# Patient Record
Sex: FEMALE | Race: WHITE | NOT HISPANIC OR LATINO | Employment: UNEMPLOYED | URBAN - METROPOLITAN AREA
[De-identification: names, ages, dates, MRNs, and addresses within clinical notes are randomized per-mention and may not be internally consistent; named-entity substitution may affect disease eponyms.]

---

## 2018-01-29 ENCOUNTER — TELEPHONE (OUTPATIENT)
Dept: VASCULAR SURGERY | Facility: CLINIC | Age: 43
End: 2018-01-29

## 2018-01-29 DIAGNOSIS — R60.0 LOCALIZED EDEMA: Primary | ICD-10-CM

## 2018-01-29 NOTE — TELEPHONE ENCOUNTER
----- Message from Jennie Diallo sent at 1/29/2018 10:32 AM CST -----  Contact: self  Needs to schedule Sclerotherapy. Please call back [phone]

## 2018-01-30 ENCOUNTER — TELEPHONE (OUTPATIENT)
Dept: VASCULAR SURGERY | Facility: CLINIC | Age: 43
End: 2018-01-30

## 2018-01-30 NOTE — TELEPHONE ENCOUNTER
----- Message from Brenda Griffith sent at 1/30/2018 11:16 AM CST -----  Contact: patient   [facility] Please advise.   Call back   Thanks!

## 2018-01-30 NOTE — TELEPHONE ENCOUNTER
Hx of VLGS in 2016, states she is having swelling, varicose veins bothering her.  Ultrasound of BLE veins ordered and scheduled per [facility]'s on 2/19/18, and visit scheduled with Dr Teixeira on 2/21/17.

## 2018-02-19 ENCOUNTER — HOSPITAL ENCOUNTER (OUTPATIENT)
Dept: RADIOLOGY | Facility: HOSPITAL | Age: 43
Discharge: HOME | End: 2018-02-19
Attending: THORACIC SURGERY (CARDIOTHORACIC VASCULAR SURGERY)
Payer: COMMERCIAL

## 2018-02-19 DIAGNOSIS — R60.0 LOCALIZED EDEMA: ICD-10-CM

## 2018-02-19 PROCEDURE — 93970 EXTREMITY STUDY: CPT | Mod: TC,PO

## 2018-02-19 PROCEDURE — 93970 EXTREMITY STUDY: CPT | Mod: 26,,, | Performed by: RADIOLOGY

## 2018-02-21 ENCOUNTER — TELEPHONE (OUTPATIENT)
Dept: VASCULAR SURGERY | Facility: CLINIC | Age: 43
End: 2018-02-21

## 2018-02-21 ENCOUNTER — OFFICE VISIT (OUTPATIENT)
Dept: VASCULAR SURGERY | Facility: CLINIC | Age: 43
End: 2018-02-21
Payer: COMMERCIAL

## 2018-02-21 VITALS
WEIGHT: 150 LBS | DIASTOLIC BLOOD PRESSURE: 77 MMHG | BODY MASS INDEX: 23.54 KG/M2 | HEIGHT: 67 IN | SYSTOLIC BLOOD PRESSURE: 113 MMHG | HEART RATE: 67 BPM

## 2018-02-21 DIAGNOSIS — R60.0 LOWER EXTREMITY EDEMA: Primary | ICD-10-CM

## 2018-02-21 PROCEDURE — 99215 OFFICE O/P EST HI 40 MIN: CPT | Mod: S$GLB,,, | Performed by: THORACIC SURGERY (CARDIOTHORACIC VASCULAR SURGERY)

## 2018-02-21 PROCEDURE — 99999 PR PBB SHADOW E&M-EST. PATIENT-LVL III: CPT | Mod: PBBFAC,,, | Performed by: THORACIC SURGERY (CARDIOTHORACIC VASCULAR SURGERY)

## 2018-02-21 PROCEDURE — 3008F BODY MASS INDEX DOCD: CPT | Mod: S$GLB,,, | Performed by: THORACIC SURGERY (CARDIOTHORACIC VASCULAR SURGERY)

## 2018-02-21 NOTE — PROGRESS NOTES
OFFICE VISIT NOTE    HISTORY OF PRESENT ILLNESS:  The patient is a 43-year-old female with reticular   and spider veins of bilateral lower extremities.  She has undergone endovenous   laser treatment of bilateral greater and the right lesser saphenous veins.  I   last saw her in 2015.  Her last three procedures were Veinlite-guided   sclerotherapy.  She got excellent results and is very happy.  She has developed   some new spider and reticular veins and wants these treated.    PAST MEDICAL HISTORY:  Mitral valve prolapse, venous insufficiency of the lower   extremities, varicose veins with pain and edema.    PAST SURGICAL HISTORY:  Breast augmentation, endovenous laser treatment of   bilateral greater saphenous vein and the right lesser saphenous vein,   Veinlite-guided sclerotherapy.    ALLERGIES:  No known drug allergies.    MEDICATIONS:  Spironolactone.    FAMILY HISTORY:  Hypertension, arthritis, myocardial infarction.    SOCIAL HISTORY:  She never smoked cigarettes.  She drinks alcohol occasionally.    REVISION OF SYSTEMS:  She complains of spider and reticular veins of bilateral   lower extremities with occasional aching of the legs.  All other systems are   reviewed and are negative.    PHYSICAL EXAMINATION:  VITAL SIGNS:  Blood pressure is 113/77, respiratory rate is 18, heart rate is   67, height 5 feet 7 inches, weight 150 pounds.  GENERAL:  She is awake and alert, in no apparent distress.  HEENT:  Head is normocephalic.  Pupils equal, round and reactive.  Sclerae are   anicteric.  NECK:  Supple.  Trachea midline.  No masses.  LUNGS:  Clear.  HEART:  Has a regular rate and rhythm.  ABDOMEN:  Soft and nontender.  No masses.  Bowel sounds are present.  EXTREMITIES:  No ulcers.  No edema.  No hyperpigmentation.  She has reticular   and spider veins of bilateral lower extremities.  NEUROLOGIC:  Awake, alert and oriented.  No lateralizing neurologic deficits.    STUDIES:  Ultrasound of the veins of the lower  extremities showed absence of   bilateral greater and the right small saphenous veins.  These were ablated   previously.  The left lesser saphenous vein shows no evidence of venous   insufficiency.  She has no DVT nor venous insufficiency of the deep system.    IMPRESSION:  1.  Reticular veins of bilateral lower extremities.  2.  Spider veins of bilateral lower extremities.  3.  Status post endovenous laser ablation of bilateral greater and the right   lesser saphenous veins.    PLAN:  We will proceed with Veinlite-guided sclerotherapy of reticular and   spider veins of bilateral lower extremities.  The patient is aware of the risks   of hyperpigmentation, matting and ulceration of the legs.      MISTY  dd: 02/21/2018 10:01:55 (CST)  td: 02/21/2018 22:48:07 (CST)  Doc ID   #[id number]  Job ID #[id number]    CC:

## 2018-02-21 NOTE — TELEPHONE ENCOUNTER
----- Message from Lakisha Malone sent at 2/21/2018 11:02 AM CST -----  Contact: Patient  Patient is calling to schedule an appointment for sclera therapy. Please call patient to schedule.  Call Back#[phone]  Thanks

## 2018-02-26 NOTE — TELEPHONE ENCOUNTER
Call back to patient to schedule VLGS BLE, states she will call back once she returns from upcoming trip. Also states she will be cash pay.

## 2018-03-02 ENCOUNTER — TELEPHONE (OUTPATIENT)
Dept: VASCULAR SURGERY | Facility: CLINIC | Age: 43
End: 2018-03-02

## 2018-03-02 NOTE — TELEPHONE ENCOUNTER
----- Message from Nohemy Marcelino sent at 3/2/2018 12:55 PM CST -----  Patient is returning nurse's (Elen) call/please call patient back at [phone].

## 2018-03-06 ENCOUNTER — TELEPHONE (OUTPATIENT)
Dept: VASCULAR SURGERY | Facility: CLINIC | Age: 43
End: 2018-03-06

## 2018-03-06 NOTE — TELEPHONE ENCOUNTER
VLGS BLE scheduled on 4/11 @ 0930.  Instructions reviewed and copy mailed to home address.  Voices understanding, will call back as needed.

## 2018-04-11 ENCOUNTER — PROCEDURE VISIT (OUTPATIENT)
Dept: VASCULAR SURGERY | Facility: CLINIC | Age: 43
End: 2018-04-11

## 2018-04-11 VITALS
WEIGHT: 150 LBS | DIASTOLIC BLOOD PRESSURE: 75 MMHG | HEART RATE: 63 BPM | HEIGHT: 67 IN | SYSTOLIC BLOOD PRESSURE: 112 MMHG | BODY MASS INDEX: 23.54 KG/M2

## 2018-04-11 DIAGNOSIS — I83.813 VARICOSE VEINS OF BOTH LOWER EXTREMITIES WITH PAIN: Primary | ICD-10-CM

## 2018-04-11 PROCEDURE — 36471 NJX SCLRSNT MLT INCMPTNT VN: CPT | Mod: RT,S$GLB,, | Performed by: THORACIC SURGERY (CARDIOTHORACIC VASCULAR SURGERY)

## 2018-04-11 NOTE — PROGRESS NOTES
HISTORY OF PRESENT ILLNESS:  The patient is a 43-year-old female with reticular   and spider veins of bilateral lower extremities.  She has undergone endovenous   laser treatment of bilateral greater and the right lesser saphenous veins.  I   last saw her in 2015.  Her last three procedures were Veinlite-guided   sclerotherapy.  She got excellent results and is very happy.  She has developed   some new spider and reticular veins and wants these treated.     PAST MEDICAL HISTORY:  Mitral valve prolapse, venous insufficiency of the lower   extremities, varicose veins with pain and edema.     PAST SURGICAL HISTORY:  Breast augmentation, endovenous laser treatment of   bilateral greater saphenous vein and the right lesser saphenous vein,   Veinlite-guided sclerotherapy.     ALLERGIES:  No known drug allergies.     MEDICATIONS:  Spironolactone.     FAMILY HISTORY:  Hypertension, arthritis, myocardial infarction.     SOCIAL HISTORY:  She never smoked cigarettes.  She drinks alcohol occasionally.     REVISION OF SYSTEMS:  She complains of spider and reticular veins of bilateral   lower extremities with occasional aching of the legs.  All other systems are   reviewed and are negative.     PHYSICAL EXAMINATION:  VITAL SIGNS:  Blood pressure is 113/77, respiratory rate is 18, heart rate is   67, height 5 feet 7 inches, weight 150 pounds.  GENERAL:  She is awake and alert, in no apparent distress.  HEENT:  Head is normocephalic.  Pupils equal, round and reactive.  Sclerae are   anicteric.  NECK:  Supple.  Trachea midline.  No masses.  LUNGS:  Clear.  HEART:  Has a regular rate and rhythm.  ABDOMEN:  Soft and nontender.  No masses.  Bowel sounds are present.  EXTREMITIES:  No ulcers.  No edema.  No hyperpigmentation.  She has reticular   and spider veins of bilateral lower extremities.  NEUROLOGIC:  Awake, alert and oriented.  No lateralizing neurologic deficits.     STUDIES:  Ultrasound of the veins of the lower extremities  showed absence of   bilateral greater and the right small saphenous veins.  These were ablated   previously.  The left lesser saphenous vein shows no evidence of venous   insufficiency.  She has no DVT nor venous insufficiency of the deep system.     IMPRESSION:  1.  Reticular veins of bilateral lower extremities.  2.  Spider veins of bilateral lower extremities.  3.  Status post endovenous laser ablation of bilateral greater and the right   lesser saphenous veins.     PLAN:  We will proceed with Veinlite-guided sclerotherapy of reticular and   spider veins of bilateral lower extremities.  The patient is aware of the risks   of hyperpigmentation, matting and ulceration of the legs.

## 2018-04-11 NOTE — PROCEDURES
DATE OF PROCEDURE:  04/11/2018    PREOPERATIVE DIAGNOSIS:  Varicose veins with pain and edema of bilateral lower   extremities.    POSTOPERATIVE DIAGNOSIS:  Varicose veins with pain and edema of bilateral lower   extremities.    PROCEDURE:  Veinlite-guided sclerotherapy of multiple veins of the right lower   extremity.    SURGEON:  Oscar Teixeira M.D., F.A.C.S.    PROCEDURE IN DETAIL:  With the patient in the supine position on the procedure   table in the procedure room in the clinic, the anterior, lateral, and medial   aspects of the right lower extremity were prepped with alcohol and then treated   with Veinlite-guided sclerotherapy.  The patient was then placed in the prone   position and the posterior aspect of the right lower extremity was treated in a   similar fashion.  The sclerotherapy solution used was 0.5% polidocanol.  A total   of 20 mL of this solution was injected into multiple veins of the right lower   extremity.  The patient needs Veinlite-guided sclerotherapy of the left lower   extremity, but we had reached our limit with the amount of solution injected at   one session.  The patient will be brought back for Veinlite-guided sclerotherapy   of the left lower extremity.  Compression stocking was applied to the lower   extremities.  The patient tolerated the procedure and left the procedure room in   satisfactory and stable condition.      MISTY  dd: 04/11/2018 11:10:32 (CDT)  td: 04/11/2018 23:51:20 (CDT)  Doc ID   #[id number]  Job ID #[id number]    CC:

## 2018-04-25 ENCOUNTER — PROCEDURE VISIT (OUTPATIENT)
Dept: VASCULAR SURGERY | Facility: CLINIC | Age: 43
End: 2018-04-25

## 2018-04-25 ENCOUNTER — TELEPHONE (OUTPATIENT)
Dept: VASCULAR SURGERY | Facility: CLINIC | Age: 43
End: 2018-04-25

## 2018-04-25 VITALS
DIASTOLIC BLOOD PRESSURE: 68 MMHG | WEIGHT: 150 LBS | RESPIRATION RATE: 18 BRPM | SYSTOLIC BLOOD PRESSURE: 113 MMHG | BODY MASS INDEX: 23.49 KG/M2 | HEART RATE: 79 BPM

## 2018-04-25 DIAGNOSIS — I83.812 VARICOSE VEINS OF LEFT LOWER EXTREMITY WITH PAIN: Primary | ICD-10-CM

## 2018-04-25 PROCEDURE — 36471 NJX SCLRSNT MLT INCMPTNT VN: CPT | Mod: LT,S$GLB,, | Performed by: THORACIC SURGERY (CARDIOTHORACIC VASCULAR SURGERY)

## 2018-04-25 RX ORDER — FERROUS SULFATE, DRIED 160(50) MG
1 TABLET, EXTENDED RELEASE ORAL 2 TIMES DAILY WITH MEALS
COMMUNITY

## 2018-04-25 NOTE — PROCEDURES
DATE OF PROCEDURE:  04/25/2018    PREOPERATIVE DIAGNOSIS:  Varicose veins with pain and edema of the left lower   extremity.    POSTOPERATIVE DIAGNOSIS:  Varicose veins with pain and edema of the left lower   extremity.    OPERATION:  Veinlite-guided sclerotherapy of the left lower extremity.    SURGEON:  Oscar Teixeira M.D., F.A.C.S.    PROCEDURE IN DETAIL:  With the patient in the supine position on the procedure   table in the procedure room in the clinic, the anterior, lateral, and medial   aspects of the left lower extremity were prepped with alcohol and then treated   with Veinlite-guided sclerotherapy.  The patient was then placed in the prone   position and the posterior aspect of the left lower extremity was treated in a   similar fashion.  The sclerotherapy solution used was 0.5% polidocanol.  A total   of 20 mL of this solution was injected into multiple veins of the left lower   extremity using a 30-gauge needle attached to a 3 mL syringe.  A compression   dressing was applied to the lower extremity.  The patient tolerated the   procedure and left the procedure room in satisfactory and stable condition.      MISTY  dd: 04/25/2018 17:48:33 (CDT)  td: 04/26/2018 00:38:38 (CDT)  Doc ID   #[id number]  Job ID #[id number]    CC:

## 2018-04-25 NOTE — TELEPHONE ENCOUNTER
----- Message from Nohemy Marcelino sent at 4/25/2018 12:08 PM CDT -----  Patient needs to reschedule appointment on May 9th/please call back at [phone] to reschedule or advise.

## 2018-04-25 NOTE — PROGRESS NOTES
"Patient Name: Mary Plaza     Date: 04/25/2018    Patient Verification: Two stated identifiers    Laser Safety Checklist: N/A    Procedure: Sclerotherapy     NPO since: NA    Height: 5'7"   Weight: 150 lbs      Pre-op Vitals:   BP: 113 / 68  HR: 79 bpm   RR: 18    Allergies reviewed.  Medications reviewed.  Sedation plan reviewed.    Time Out:   Correct patient: LM   Correct procedure: LM     Correct site (marked): LM  Time completed: 1037    Procedure Start Time: 1125    Position of Patient: SUPINE AND PRONE    Treatment Solutions:    ASCLERA: Yes; 0.5 %; 20 total cc   (Solution of 2.0 cc of 1% ASCLERA, 2.0 cc of STERILE WATER)     Ultrasound Guided Sclerotherapy: No   Vein Light Guided Sclerotherapy: Yes     Laser Ablation of Spider Veins: No    Procedure End Time: 1155    Total Laser:     Total Area:  Joules: NA     NA  Pulses: NA  Time: NA    Rx Given: NO  Compression Hose Issued: NO  Post-procedural Education Given: Yes  LOC at Discharge: ALERT AND ORIENTED  Discharge Time: 1205    MD: Oscar Teixeira  RN: Julissa Du  LPN: Elen Hart  "

## 2018-04-25 NOTE — PROGRESS NOTES
HISTORY OF PRESENT ILLNESS:  The patient is a 43-year-old female with reticular   and spider veins of bilateral lower extremities.  She has undergone endovenous   laser treatment of bilateral greater and the right lesser saphenous veins.  I   last saw her in 2015.  Her last three procedures were Veinlite-guided   sclerotherapy.  She got excellent results and is very happy.  She has developed some new spider and reticular veins and wants these treated. I did sclerotherapy of the right lower extremity 2 weeks ago.     PAST MEDICAL HISTORY:  Mitral valve prolapse, venous insufficiency of the lower   extremities, varicose veins with pain and edema.     PAST SURGICAL HISTORY:  Breast augmentation, endovenous laser treatment of   bilateral greater saphenous vein and the right lesser saphenous vein,   Veinlite-guided sclerotherapy.     ALLERGIES:  No known drug allergies.     MEDICATIONS:  Spironolactone.     FAMILY HISTORY:  Hypertension, arthritis, myocardial infarction.     SOCIAL HISTORY:  She never smoked cigarettes.  She drinks alcohol occasionally.     REVISION OF SYSTEMS:  She complains of spider and reticular veins of bilateral   lower extremities with occasional aching of the legs.  All other systems are   reviewed and are negative.     PHYSICAL EXAMINATION:  VITAL SIGNS:  Blood pressure is 113/77, respiratory rate is 18, heart rate is   67, height 5 feet 7 inches, weight 150 pounds.  GENERAL:  She is awake and alert, in no apparent distress.  HEENT:  Head is normocephalic.  Pupils equal, round and reactive.  Sclerae are   anicteric.  NECK:  Supple.  Trachea midline.  No masses.  LUNGS:  Clear.  HEART:  Has a regular rate and rhythm.  ABDOMEN:  Soft and nontender.  No masses.  Bowel sounds are present.  EXTREMITIES:  No ulcers.  No edema.  No hyperpigmentation.  She has reticular   and spider veins of bilateral lower extremities.  NEUROLOGIC:  Awake, alert and oriented.  No lateralizing neurologic  deficits.     STUDIES:  Ultrasound of the veins of the lower extremities showed absence of   bilateral greater and the right small saphenous veins.  These were ablated   previously.  The left lesser saphenous vein shows no evidence of venous   insufficiency.  She has no DVT nor venous insufficiency of the deep system.     IMPRESSION:  1.  Reticular veins of bilateral lower extremities.  2.  Spider veins of bilateral lower extremities.  3.  Status post endovenous laser ablation of bilateral greater and the right   lesser saphenous veins.  4. S/P veinlight guided sclerotherapy of theRLE     PLAN:  We will proceed with Veinlite-guided sclerotherapy of reticular and   spider veins of the left lower extremities.  The patient is aware of the risks   of hyperpigmentation, matting and ulceration of the legs.

## 2018-05-17 ENCOUNTER — OFFICE VISIT (OUTPATIENT)
Dept: VASCULAR SURGERY | Facility: CLINIC | Age: 43
End: 2018-05-17
Payer: COMMERCIAL

## 2018-05-17 VITALS
BODY MASS INDEX: 23.54 KG/M2 | DIASTOLIC BLOOD PRESSURE: 81 MMHG | HEART RATE: 62 BPM | WEIGHT: 150 LBS | SYSTOLIC BLOOD PRESSURE: 119 MMHG | HEIGHT: 67 IN

## 2018-05-17 DIAGNOSIS — I83.813 VARICOSE VEINS OF BOTH LOWER EXTREMITIES WITH PAIN: Primary | ICD-10-CM

## 2018-05-17 PROCEDURE — 99211 OFF/OP EST MAY X REQ PHY/QHP: CPT | Mod: S$GLB,,, | Performed by: THORACIC SURGERY (CARDIOTHORACIC VASCULAR SURGERY)

## 2018-05-17 PROCEDURE — 99999 PR PBB SHADOW E&M-EST. PATIENT-LVL III: CPT | Mod: PBBFAC,,, | Performed by: THORACIC SURGERY (CARDIOTHORACIC VASCULAR SURGERY)

## 2018-05-17 NOTE — PROGRESS NOTES
OFFICE VISIT NOTE    Mr. Plaza returns for followup after sclerotherapy of bilateral lower extremity   veins.  She states that the legs are feeling better and there has been   significant improvement in the veins with a lot of the veins going away.    However, there is still some present.    PAST MEDICAL HISTORY:  Unchanged.    PHYSICAL EXAMINATION:  GENERAL:  She is awake and alert, in no apparent distress.  NECK:  Supple.  HEART:  Has a regular rate and rhythm.  EXTREMITIES:  Most of the veins that were treated with sclerotherapy are no   longer present.  However, she still has some reticular and spider veins that are   still present.  We will reevaluate in eight weeks as these could still improve   with the treatment that was done two weeks ago.  It is possible that she might   need another session of sclerotherapy.      MISTY  dd: 05/17/2018 18:51:15 (CDT)  td: 05/18/2018 04:19:40 (CDT)  Doc ID   #[id number]  Job ID #[id number]    CC:

## 2018-05-22 ENCOUNTER — PATIENT MESSAGE (OUTPATIENT)
Dept: VASCULAR SURGERY | Facility: CLINIC | Age: 43
End: 2018-05-22

## 2019-05-07 ENCOUNTER — TELEPHONE (OUTPATIENT)
Dept: VASCULAR SURGERY | Facility: CLINIC | Age: 44
End: 2019-05-07

## 2019-05-07 NOTE — TELEPHONE ENCOUNTER
Request to schedule sclerotherapy for varicose veins LLE, states as in the past, will be cash pay.  Procedure scheduled on 8/7/19 @ 7363, pre & post instructions mailed to home address.  Voices understanding, will call back as needed.

## 2019-05-07 NOTE — TELEPHONE ENCOUNTER
----- Message from Kallie Webb sent at 5/7/2019 11:26 AM CDT -----  Contact: pt  Pt calling states would like to make an appointment for spider vein treatment..[phone] (home)

## 2019-08-05 ENCOUNTER — TELEPHONE (OUTPATIENT)
Dept: VASCULAR SURGERY | Facility: CLINIC | Age: 44
End: 2019-08-05

## 2019-08-05 NOTE — TELEPHONE ENCOUNTER
----- Message from Gabriela Mares RT sent at 8/5/2019  8:16 AM CDT -----  Contact: Pt    Pt , requesting to reschedule her procedure appt of 08/07/2019, thanks.

## 2019-10-16 ENCOUNTER — PROCEDURE VISIT (OUTPATIENT)
Dept: VASCULAR SURGERY | Facility: CLINIC | Age: 44
End: 2019-10-16

## 2019-10-16 DIAGNOSIS — I83.813 VARICOSE VEINS OF BOTH LOWER EXTREMITIES WITH PAIN: Primary | ICD-10-CM

## 2019-10-16 PROCEDURE — 36471 PR INJECTION THERAPY VEIN,MULT VEINS: ICD-10-PCS | Mod: LT,S$GLB,, | Performed by: THORACIC SURGERY (CARDIOTHORACIC VASCULAR SURGERY)

## 2019-10-16 PROCEDURE — 36471 NJX SCLRSNT MLT INCMPTNT VN: CPT | Mod: LT,S$GLB,, | Performed by: THORACIC SURGERY (CARDIOTHORACIC VASCULAR SURGERY)

## 2019-10-16 NOTE — PROGRESS NOTES
"Patient Name: Mary Plaza     Date: 10/16/2019    Patient Verification: Two stated identifiers    Laser Safety Checklist: N/A    Procedure: Sclerotherapy          Pre-op Vitals:  BP: 118 / 71  HR:67 bpm Height: 5'7" Weight: 144 lbs       [x] Allergies Reviewed  [x] Medications Reviewed  [x] Consent Reviewed & Signed       Time Out:     Correct patient: Yes       Correct procedure: Yes   Correct site (marked): Yes    Correct Position: Yes - supine & prone  Correct Laterality: Yes - Left  Time completed: 1255    Procedure Start Time: 1300    Treatment Solutions:      Asclera: Yes; 0.5 %; 20 total cc   (Solution of 2cc of 1% Asclera,  2cc of Sterile Water)      Ultrasound Guided Sclerotherapy: No     Vein Light Guided Sclerotherapy: Yes       Procedure End Time: 1340    Post-procedural Education Given: Yes    LOC at Discharge: Alert & Oriented    Discharge Time: 1350    MD: Raleigh Teixeira  NURSE: Elen Hart LPN  FA: Teresa Patel    "

## 2019-10-17 NOTE — PROCEDURES
DATE OF PROCEDURE:  10/16/2019    PREOPERATIVE DIAGNOSIS:  Varicose veins with pain and edema of bilateral lower   extremities.    POSTOPERATIVE DIAGNOSIS:  Varicose veins with pain and edema of bilateral lower   extremities.    PROCEDURE PERFORMED:  Veinlite-guided sclerotherapy of multiple veins of the   left lower extremity.    SURGEON:  Oscar Teixeira M.D.    PROCEDURE IN DETAIL:  With the patient in the supine position on the procedure   table in the Procedure Room in the clinic, the anterolateral and medial aspects   of the left lower extremity were prepped with alcohol and then treated with   Veinlite-guided sclerotherapy.  The patient was then placed in the prone   position and the posterior aspect of the left lower extremity was treated in a   similar fashion.  The sclerotherapy solution used was 0.5% polidocanol.  A total   of 17 mL of this solution was injected into multiple veins of bilateral lower   extremities.  Compression stocking was applied to the lower extremity.  The   patient tolerated the procedure and left the Procedure Room in satisfactory and   stable condition.  The patient will now be scheduled for Veinlite-guided   sclerotherapy of the right lower extremity in the future.      ALISON/IN  dd: 10/16/2019 17:46:17 (CDT)  td: 10/17/2019 14:58:41 (CDT)  Doc ID   #[id number]  Job ID #[id number]    CC:

## 2019-10-18 ENCOUNTER — TELEPHONE (OUTPATIENT)
Dept: VASCULAR SURGERY | Facility: CLINIC | Age: 44
End: 2019-10-18

## 2019-10-18 NOTE — TELEPHONE ENCOUNTER
Left message on voicemail requesting a call back to schedule follow up and/or sclerotherapy to RLE.

## 2019-10-28 ENCOUNTER — TELEPHONE (OUTPATIENT)
Dept: VASCULAR SURGERY | Facility: CLINIC | Age: 44
End: 2019-10-28

## 2019-10-28 NOTE — TELEPHONE ENCOUNTER
----- Message from Emy Wilson sent at 10/28/2019 11:08 AM CDT -----  Contact: Patient  Type: Needs Medical Advice    Who Called:  Mary Smith Call Back Number: [phone]  Additional Information: Patient is calling to speak with a nurse about her appt.Please call back and advise.

## 2019-10-29 NOTE — TELEPHONE ENCOUNTER
Informed will confirm with Dr Teixeira re: sclero charges and call her back with his response.  Voices understanding and is agreeable.

## 2019-11-06 ENCOUNTER — PROCEDURE VISIT (OUTPATIENT)
Dept: VASCULAR SURGERY | Facility: CLINIC | Age: 44
End: 2019-11-06
Payer: COMMERCIAL

## 2019-11-06 DIAGNOSIS — I83.813 VARICOSE VEINS OF BOTH LOWER EXTREMITIES WITH PAIN: Primary | ICD-10-CM

## 2019-11-06 NOTE — H&P
HISTORY OF PRESENT ILLNESS:  The patient is a 43-year-old female with reticular   and spider veins of bilateral lower extremities.  She has undergone endovenous   laser treatment of bilateral greater and the right lesser saphenous veins.  I   last saw her in 2015.  Her last three procedures were Veinlite-guided   sclerotherapy.  She got excellent results and is very happy. A few weeks ago she underwent sclerotherapy of the LLE    PAST MEDICAL HISTORY:  Mitral valve prolapse, venous insufficiency of the lower   extremities, varicose veins with pain and edema.     PAST SURGICAL HISTORY:  Breast augmentation, endovenous laser treatment of   bilateral greater saphenous vein and the right lesser saphenous vein,   Veinlite-guided sclerotherapy.     ALLERGIES:  No known drug allergies.     MEDICATIONS:  Spironolactone.     FAMILY HISTORY:  Hypertension, arthritis, myocardial infarction.     SOCIAL HISTORY:  She never smoked cigarettes.  She drinks alcohol occasionally.     REVISION OF SYSTEMS:  She complains of spider and reticular veins of bilateral   lower extremities with occasional aching of the legs.  All other systems are   reviewed and are negative.     PHYSICAL EXAMINATION:  VITAL SIGNS:  Blood pressure is 113/77, respiratory rate is 18, heart rate is   67, height 5 feet 7 inches, weight 150 pounds.  GENERAL:  She is awake and alert, in no apparent distress.  HEENT:  Head is normocephalic.  Pupils equal, round and reactive.  Sclerae are   anicteric.  NECK:  Supple.  Trachea midline.  No masses.  LUNGS:  Clear.  HEART:  Has a regular rate and rhythm.  ABDOMEN:  Soft and nontender.  No masses.  Bowel sounds are present.  EXTREMITIES:  No ulcers.  No edema.  No hyperpigmentation.  She has reticular   and spider veins of bilateral lower extremities.  NEUROLOGIC:  Awake, alert and oriented.  No lateralizing neurologic deficits.     STUDIES:  Ultrasound of the veins of the lower extremities showed absence of   bilateral  greater and the right small saphenous veins.  These were ablated   previously.  The left lesser saphenous vein shows no evidence of venous   insufficiency.  She has no DVT nor venous insufficiency of the deep system.     IMPRESSION:  1.  Reticular veins of bilateral lower extremities.  2.  Spider veins of bilateral lower extremities.  3.  Status post endovenous laser ablation of bilateral greater and the right   lesser saphenous veins.     PLAN:  We will proceed with Veinlite-guided sclerotherapy of reticular and   spider veins of the right lower extremity today.  The patient is aware of the risks of hyperpigmentation, matting and ulceration of the legs.

## 2019-11-19 ENCOUNTER — TELEPHONE (OUTPATIENT)
Dept: VASCULAR SURGERY | Facility: CLINIC | Age: 44
End: 2019-11-19

## 2019-11-19 NOTE — TELEPHONE ENCOUNTER
----- Message from Vesna Thompson sent at 11/19/2019  8:06 AM CST -----  Contact: self [phone]  Mary Plaza calling regarding Cancellation for 11/20/19 with Dr Teran, requesting to reschedule.  Contact# [phone]

## 2019-11-19 NOTE — TELEPHONE ENCOUNTER
Pt stated she is unable to make the follow up appt scheduled for tomorrow and will call back to reschedule.

## 2020-09-15 NOTE — TELEPHONE ENCOUNTER
----- Message from Diana Bermudez sent at 3/6/2018 10:46 AM CST -----  Contact: patient   Patient returning a missed call. Please advise. Call to pod. No answer.   Call back    Thanks!   Hospitalist

## 2021-06-08 ENCOUNTER — TELEPHONE (OUTPATIENT)
Dept: VASCULAR SURGERY | Facility: CLINIC | Age: 46
End: 2021-06-08

## 2025-05-16 NOTE — PROCEDURES
DATE OF PROCEDURE:  11/06/2019    PREOPERATIVE DIAGNOSIS:  Varicose veins of the right lower extremity.    POSTOPERATIVE DIAGNOSIS:  Varicose veins of the right lower extremity.    OPERATION:  Sclerotherapy of the right lower extremity.    SURGEON:  Oscar Teixeira M.D., [facility]    PROCEDURE IN DETAIL:  With the patient in the supine position on the procedure   table in the procedure room in the clinic, the anterior, lateral and medial   aspects of the right lower extremity were prepped with alcohol and then treated   with Veinlite-guided sclerotherapy.  The patient was then placed in the prone   position and the posterior aspect of the right lower extremity was treated in a   similar fashion.  The sclerotherapy solution used was 0.5% polidocanol.  A total   of 20 mL of this solution was injected into multiple veins of the right lower   extremity using a 30-gauge needle attached to a 3 mL syringe.  Compression   dressing was applied to the lower extremity.  The patient tolerated the   procedure.      ALISON/IN  dd: 11/06/2019 18:36:36 (CST)  td: 11/07/2019 02:17:26 (CST)  Doc ID   #[id number]  Job ID #[id number]    CC:       
PROVIDER:[TOKEN:[[id number]],FOLLOWUP:[2 weeks]]